# Patient Record
Sex: FEMALE | Race: WHITE | NOT HISPANIC OR LATINO | Employment: PART TIME | URBAN - METROPOLITAN AREA
[De-identification: names, ages, dates, MRNs, and addresses within clinical notes are randomized per-mention and may not be internally consistent; named-entity substitution may affect disease eponyms.]

---

## 2021-09-21 ENCOUNTER — LAB REQUISITION (OUTPATIENT)
Dept: LAB | Facility: CLINIC | Age: 21
End: 2021-09-21

## 2021-09-21 PROCEDURE — 86481 TB AG RESPONSE T-CELL SUSP: CPT | Performed by: INTERNAL MEDICINE

## 2021-09-23 LAB
GAMMA INTERFERON BACKGROUND BLD IA-ACNC: 0.04 IU/ML
M TB IFN-G BLD-IMP: NEGATIVE
M TB IFN-G CD4+ BCKGRND COR BLD-ACNC: 9.96 IU/ML
MITOGEN IGNF BCKGRD COR BLD-ACNC: 0.03 IU/ML
MITOGEN IGNF BCKGRD COR BLD-ACNC: 0.04 IU/ML
QUANTIFERON MITOGEN: 10 IU/ML
QUANTIFERON NIL TUBE: 0.04 IU/ML
QUANTIFERON TB1 TUBE: 0.07 IU/ML
QUANTIFERON TB2 TUBE: 0.08

## 2023-05-08 ENCOUNTER — OFFICE VISIT (OUTPATIENT)
Dept: URGENT CARE | Facility: URGENT CARE | Age: 23
End: 2023-05-08
Payer: COMMERCIAL

## 2023-05-08 VITALS
WEIGHT: 293 LBS | SYSTOLIC BLOOD PRESSURE: 119 MMHG | DIASTOLIC BLOOD PRESSURE: 79 MMHG | RESPIRATION RATE: 94 BRPM | TEMPERATURE: 97.7 F | HEART RATE: 76 BPM

## 2023-05-08 DIAGNOSIS — J30.89 SEASONAL ALLERGIC RHINITIS DUE TO OTHER ALLERGIC TRIGGER: Primary | ICD-10-CM

## 2023-05-08 DIAGNOSIS — R09.81 CONGESTION OF PARANASAL SINUS: ICD-10-CM

## 2023-05-08 PROCEDURE — 99203 OFFICE O/P NEW LOW 30 MIN: CPT

## 2023-05-08 RX ORDER — BUPROPION HYDROCHLORIDE 150 MG/1
1 TABLET ORAL EVERY MORNING
COMMUNITY
Start: 2023-04-06

## 2023-05-08 RX ORDER — AMOXICILLIN 875 MG
875 TABLET ORAL
COMMUNITY
Start: 2023-04-29 | End: 2023-05-09

## 2023-05-08 NOTE — PROGRESS NOTES
"URGENT CARE  Assessment & Plan   Assessment:   Yesy Hernández is a 22 year old female who's clinical presentation today is consistent with:   1. Seasonal allergic rhinitis due to other allergic trigger  2. Congestion of paranasal sinus    No alarm signs or symptoms present   Differential Diagnoses for this patient's CC include   Polyps, turbinate hypertrophy   Chronic sinusitis, adenoiditis, allergic rhinitis    ABRS, viral, or fungal etiology   Tumors/malignancy, foreign body     Plan:  Will treat patient with supportive and symptomatic measures for sinusitis at this time which include: Fluids, rest, over-the-counter medications: decongestants, antihistamines,  and expectorants, side effects of medications reviewed.    Additionally we discussed if symptoms do not improve after starting today's treatment (or if symptoms worsen) to follow up in 7-10 days.    Patient and parent are agreeable to treatment plan and state they will follow-up if symptoms do not improve and/or if symptoms worsen (see patient's AVS 'monitor for' section for specific patient instructions given and discussed regarding what to watch for and when to follow up)    Medications ordered are listed above, please see AVS for patient's specific and personalized discharge instructions given     RAFAELA [facility] [place]      ______________________________________________________________________        Subjective  Subjective     HPI: Yesy Hernández  is a 22 year old  female who presents today for evaluation the following concerns:   Patient  endorses sinus congestion today which has been going on for \"a couple of weeks\"   Patient reports sinus congestion and headache    Patient denies any fevers} sweats, chills, myalgias, wheezing, shortness of breath, difficulty breathing, chest pain, weakness or signs of dehydration   additionally patient denies a history of asthma or any other past medical history of " breathing conditions.        Allergies   Allergen Reactions     Oxycodone-Acetaminophen Nausea     Sumatriptan      Other reaction(s): GI Upset  Patient with reported neck pain/muscle spasm and nausea     There is no problem list on file for this patient.      Review of Systems:  Pertinent review of systems as reflected in HPI, otherwise negative.     Objective  Objective    Physical Exam:  Vitals:    05/08/23 1302   BP: 119/79   Pulse: 76   Resp: (!) 94   Temp: 97.7  F (36.5  C)   TempSrc: Tympanic   Weight: (!) 155.1 kg (342 lb)      General: Alert and oriented, no acute distress, Vital signs reviewed: afebrile,  normotensive   Psy/mental status: Cooperative, nonanxious  SKIN: Intact, no rashes  EYES: EOMs intact, PERRLA bilaterally   Conjunctiva: Clear bilaterally, no injection or erythema present  EARS: TMs intact, translucent gray in color with normal landmarks present no erythema  or bulging tympanic membrane   Canals are without swelling, however have a mild amount of cerumen, no impaction  NOSE:  mucosa erythematous bilaterally with a mild amount of rhinorrhea, clear  discharge}               No frontal or maxillary sinus tenderness present bilaterally  MOUTH/THROAT: lips, tongue, & oral mucosa appear normal upon inspection                Posterior oropharynx is erythematous but without exudate, lesions or tonsillar  Edema, no dysphonia, no unilateral tonsillar edema, no uvular deviation,   no signs of peritonsillar abscess  NECK: supple, has full range of motion with no meningeal signs              No lymphadenopathy present  LUNG: normal work of breathing, good respiratory effort without retractions, good air  movement, non labored, inspection reveals normal chest expansion w/  inspiration            Lung sounds are clear to auscultation bilaterally,            No rales/rhonic/crackles wheezing noted           No cough noted       I explained my diagnostic considerations and recommendations to the patient,  who voiced understanding and agreement with the treatment plan.   All questions were answered.   We discussed potential side effects, risks and benefits of any prescribed or recommended therapies, as well as expectations for response to treatments.  Please see AVS for any patient instructions & handouts given.   Patient was advised to contact the Nurse Care Line, their Primary Care provider, Urgent Care, or the Emergency Department if there are new or worsening symptoms, or call 911 for emergencies.        ______________________________________________________________________          Patient Instructions     Diagnosis:   Sinusitis- sinus pain (non bacterial cause) Viral sinusitis   The majority of sinusitis is not caused by bacteria and thus antibiotics are not indicated.   Green/yellow or thick mucus does not mean you have a bacterial sinus infection   Antibiotics can have anti-inflammatory effects and thus may decrease symptoms slightly but the side effects outweigh this risk.   For bacterial sinus infections, Azithromycin (Z-Pack) is not a first line nor a second line medication for this in part because of bacterial resistance caused in part by over-prescribing    Plan:     decongestants and antihistamines   1. Pseudoephedrine/ sudafed - behind the counter pharmacy - take while congested   2. Cetazine / loratadine   take daily   3. Benadryl - help w/ sleeping and breathing while sleeping     Nasal Sprays: When using a nasal spray insert the application into the nostril while  looking downward to the floor.     Remember NOSE toward TOES. This will help ensure the medication stays  where it is supposed to be and it will work better.     Nasal Saline may be used to help alleviate symptoms    Fluticasone (e.g. Flonase, Nasacort, etc) nasal spray is a nasal steroid that works to decrease inflammation, swelling, pain, and drainage.     Use this after using nasal saline. Use 2 sprays per nostril ONE time a day for  SEVEN days.   1. Then decrease to 1 spray per nostril ONE time a day as needed.  2. Oxymetazoline (Afrin): May use ONE spray per nostril 2 times a day as needed.   1. DO NOT USE FOR MORE THAN 3 DAYS. Doing so will result in worsening symptoms.   2. Wait at least 15 minutes between Afrin and Flonase.     Use nasal rinses    Neti pots - highly effective     Ensure humidity in your living space -     Especially during the winter months when it is notably more dry.     This can be done with a humidifier.     OTC pain relievers for headache and sinus pressure     Acetaminophen (Tylenol) -or/and- Ibuprofen (advil, motrin)     Stay Hydrated - drink fluids throughout the day.        Monitor for:     Stiff neck    Abnormal lethargy or confusion    Swelling of your eyelids    Vision problems, such as blurred or double vision    Fever of 101  F or higherSymptoms that don't go away in 10 days    Seizure    Trouble breathing    Feeling dizzy or faint    Fingernails, skin, or lips look blue, purple, or gray            Understanding viral  sinusitis  Viral sinusitis is when the lining of the inside of the nose and the sinuses becomes irritated and swollen. It is also called sinusitis, or a sinus infection.  Sinuses are air-filled spaces in the skull behind the face. They are kept moist and clean by a lining of mucosa. Things such as pollen, smoke, and chemical fumes can irritate the mucosa. It can then swell up. As a response to irritation, the mucosa makes more mucus and other fluids. Tiny hairlike cilia cover the mucosa. Cilia help carry mucus toward the opening of the sinus. Too much mucus may cause the cilia to stop working. This blocks the sinus opening. A buildup of fluid in the sinuses then causes pain and pressure. It can also cause bacteria to grow in the sinuses.    Symptoms   Symptoms often last around 7 to 10 days.   They may also get better but then worsen. You may have:    Face pain or pressure under the eyes and  around the nose    Headache    Fluid draining in the back of the throat (postnasal drip)    Congestion    Drainage that is thick and colored (often green), instead of clear    Cough    Problems with your sense of smell    Ear pain or hearing problems    Fever    Tooth pain    Fatigue    Diagnosing   Your healthcare provider will ask about your symptoms and past health.   He or she will look at your ears, nose, throat, and sinuses.   Often labs are needed to determine if the sinusitis is caused by a virus or bacterium     Treating acute rhinosinusitis  Most sinus infections will go away within 10 days. Your body will fight off the virus.  The bacterial kind need antiboitics but these are rare

## 2023-05-08 NOTE — PATIENT INSTRUCTIONS
Diagnosis:   Sinusitis- sinus pain (non bacterial cause) Viral sinusitis   The majority of sinusitis is not caused by bacteria and thus antibiotics are not indicated.   Green/yellow or thick mucus does not mean you have a bacterial sinus infection   Antibiotics can have anti-inflammatory effects and thus may decrease symptoms slightly but the side effects outweigh this risk.   For bacterial sinus infections, Azithromycin (Z-Pack) is not a first line nor a second line medication for this in part because of bacterial resistance caused in part by over-prescribing    Plan:   decongestants and antihistamines   Pseudoephedrine/ sudafed - behind the counter pharmacy - take while congested   Cetazine / loratadine   take daily   Benadryl - help w/ sleeping and breathing while sleeping   Nasal Sprays: When using a nasal spray insert the application into the nostril while  looking downward to the floor.   Remember NOSE toward TOES. This will help ensure the medication stays  where it is supposed to be and it will work better.   Nasal Saline may be used to help alleviate symptoms  Fluticasone (e.g. Flonase, Nasacort, etc) nasal spray is a nasal steroid that works to decrease inflammation, swelling, pain, and drainage.   Use this after using nasal saline. Use 2 sprays per nostril ONE time a day for SEVEN days.   Then decrease to 1 spray per nostril ONE time a day as needed.  Oxymetazoline (Afrin): May use ONE spray per nostril 2 times a day as needed.   DO NOT USE FOR MORE THAN 3 DAYS. Doing so will result in worsening symptoms.   Wait at least 15 minutes between Afrin and Flonase.   Use nasal rinses  Neti pots - highly effective   Ensure humidity in your living space -   Especially during the winter months when it is notably more dry.   This can be done with a humidifier.   OTC pain relievers for headache and sinus pressure   Acetaminophen (Tylenol) -or/and- Ibuprofen (advil, motrin)   Stay Hydrated - drink fluids throughout  the day.        Monitor for:   Stiff neck  Abnormal lethargy or confusion  Swelling of your eyelids  Vision problems, such as blurred or double vision  Fever of 101  F or higherSymptoms that don't go away in 10 days  Seizure  Trouble breathing  Feeling dizzy or faint  Fingernails, skin, or lips look blue, purple, or gray            Understanding viral  sinusitis  Viral sinusitis is when the lining of the inside of the nose and the sinuses becomes irritated and swollen. It is also called sinusitis, or a sinus infection.  Sinuses are air-filled spaces in the skull behind the face. They are kept moist and clean by a lining of mucosa. Things such as pollen, smoke, and chemical fumes can irritate the mucosa. It can then swell up. As a response to irritation, the mucosa makes more mucus and other fluids. Tiny hairlike cilia cover the mucosa. Cilia help carry mucus toward the opening of the sinus. Too much mucus may cause the cilia to stop working. This blocks the sinus opening. A buildup of fluid in the sinuses then causes pain and pressure. It can also cause bacteria to grow in the sinuses.    Symptoms   Symptoms often last around 7 to 10 days.   They may also get better but then worsen. You may have:  Face pain or pressure under the eyes and around the nose  Headache  Fluid draining in the back of the throat (postnasal drip)  Congestion  Drainage that is thick and colored (often green), instead of clear  Cough  Problems with your sense of smell  Ear pain or hearing problems  Fever  Tooth pain  Fatigue    Diagnosing   Your healthcare provider will ask about your symptoms and past health.   He or she will look at your ears, nose, throat, and sinuses.   Often labs are needed to determine if the sinusitis is caused by a virus or bacterium     Treating acute rhinosinusitis  Most sinus infections will go away within 10 days. Your body will fight off the virus.  The bacterial kind need antiboitics but these are rare

## 2023-06-04 ENCOUNTER — HEALTH MAINTENANCE LETTER (OUTPATIENT)
Age: 23
End: 2023-06-04

## 2024-07-27 ENCOUNTER — HEALTH MAINTENANCE LETTER (OUTPATIENT)
Age: 24
End: 2024-07-27

## 2025-08-10 ENCOUNTER — HEALTH MAINTENANCE LETTER (OUTPATIENT)
Age: 25
End: 2025-08-10